# Patient Record
Sex: MALE | Race: ASIAN | Employment: FULL TIME | URBAN - METROPOLITAN AREA
[De-identification: names, ages, dates, MRNs, and addresses within clinical notes are randomized per-mention and may not be internally consistent; named-entity substitution may affect disease eponyms.]

---

## 2018-07-31 LAB
CHOLEST SERPL-MCNC: 125 MG/DL
HDLC SERPL-MCNC: 34 MG/DL
LDLC SERPL CALC-MCNC: 69 MG/DL
NONHDLC SERPL-MCNC: NORMAL MG/DL
TRIGL SERPL-MCNC: 112 MG/DL

## 2019-04-19 ENCOUNTER — TRANSFERRED RECORDS (OUTPATIENT)
Dept: HEALTH INFORMATION MANAGEMENT | Facility: CLINIC | Age: 39
End: 2019-04-19

## 2019-04-19 ENCOUNTER — MEDICAL CORRESPONDENCE (OUTPATIENT)
Dept: HEALTH INFORMATION MANAGEMENT | Facility: CLINIC | Age: 39
End: 2019-04-19

## 2019-04-20 LAB
ANION GAP SERPL CALCULATED.3IONS-SCNC: ABNORMAL MMOL/L
BUN SERPL-MCNC: 11 MG/DL
CALCIUM SERPL-MCNC: 10 MG/DL
CHLORIDE SERPLBLD-SCNC: 102 MMOL/L
CO2 SERPL-SCNC: 25 MMOL/L
CREAT SERPL-MCNC: 0.95 MG/DL
GFR SERPL CREATININE-BSD FRML MDRD: 100 ML/MIN/1.73M2
GLUCOSE SERPL-MCNC: 121 MG/DL (ref 65–99)
POTASSIUM SERPL-SCNC: 4.5 MMOL/L
SODIUM SERPL-SCNC: 142 MMOL/L
TSH SERPL-ACNC: 1.61 MCU/ML

## 2019-04-22 ENCOUNTER — HOSPITAL ENCOUNTER (OUTPATIENT)
Dept: CARDIOLOGY | Facility: CLINIC | Age: 39
Discharge: HOME | End: 2019-04-22
Attending: FAMILY MEDICINE | Admitting: FAMILY MEDICINE
Payer: COMMERCIAL

## 2019-04-22 ENCOUNTER — DOCUMENTATION ONLY (OUTPATIENT)
Dept: CARDIOLOGY | Facility: CLINIC | Age: 39
End: 2019-04-22

## 2019-04-22 DIAGNOSIS — Z82.49 FAMILY HISTORY OF MITRAL VALVE PROLAPSE: ICD-10-CM

## 2019-04-22 DIAGNOSIS — R00.2 PALPITATIONS: ICD-10-CM

## 2019-04-22 PROCEDURE — 93306 TTE W/DOPPLER COMPLETE: CPT

## 2019-04-22 PROCEDURE — 93306 TTE W/DOPPLER COMPLETE: CPT | Mod: 26 | Performed by: INTERNAL MEDICINE

## 2019-04-22 NOTE — PROGRESS NOTES
Records received from patient's PCP including OV note and labs. Labs entered into snapshot, copy sent to scan.

## 2019-05-02 ENCOUNTER — OFFICE VISIT (OUTPATIENT)
Dept: CARDIOLOGY | Facility: CLINIC | Age: 39
End: 2019-05-02
Payer: COMMERCIAL

## 2019-05-02 VITALS
BODY MASS INDEX: 30.53 KG/M2 | SYSTOLIC BLOOD PRESSURE: 153 MMHG | HEART RATE: 80 BPM | HEIGHT: 66 IN | WEIGHT: 190 LBS | DIASTOLIC BLOOD PRESSURE: 98 MMHG

## 2019-05-02 DIAGNOSIS — R00.2 PALPITATIONS: ICD-10-CM

## 2019-05-02 DIAGNOSIS — R03.0 ELEVATED BLOOD PRESSURE READING WITHOUT DIAGNOSIS OF HYPERTENSION: Primary | ICD-10-CM

## 2019-05-02 PROCEDURE — 99204 OFFICE O/P NEW MOD 45 MIN: CPT | Performed by: INTERNAL MEDICINE

## 2019-05-02 ASSESSMENT — MIFFLIN-ST. JEOR: SCORE: 1714.58

## 2019-05-02 NOTE — LETTER
5/2/2019    Herman Mclaughlin MD  [facility] [address]    [facility] [address]    RE: August Egan       Dear Colleague,    I had the pleasure of seeing August Egan in the [facility].    HPI and Plan:   See dictation    Orders Placed This Encounter   Procedures     24 Hour Blood Pressure Monitor - Adult       Orders Placed This Encounter   Medications     UNABLE TO FIND     Sig: daily Vit C complex (super vit c)       There are no discontinued medications.      Encounter Diagnoses   Name Primary?     Elevated blood pressure reading without diagnosis of hypertension Yes     Palpitations        CURRENT MEDICATIONS:  Current Outpatient Medications   Medication Sig Dispense Refill     UNABLE TO FIND daily Vit C complex (super vit c)         ALLERGIES   No Known Allergies    PAST MEDICAL HISTORY:  Past Medical History:   Diagnosis Date     Mitral valve prolapse        PAST SURGICAL HISTORY:  History reviewed. No pertinent surgical history.    FAMILY HISTORY:  Family History   Family history unknown: Yes       SOCIAL HISTORY:  Social History     Socioeconomic History     Marital status:      Spouse name: None     Number of children: None     Years of education: None     Highest education level: None   Occupational History     None   Social Needs     Financial resource strain: None     Food insecurity:     Worry: None     Inability: None     Transportation needs:     Medical: None     Non-medical: None   Tobacco Use     Smoking status: Never Smoker     Smokeless tobacco: Never Used   Substance and Sexual Activity     Alcohol use: Yes     Comment: occassional     Drug use: No     Sexual activity: Yes     Partners: Female   Lifestyle     Physical activity:     Days per week: None     Minutes per session: None     Stress: None   Relationships     Social connections:     Talks on phone: None     Gets together: None     Attends [employer]  "service: None     Active member of club or organization: None     Attends meetings of clubs or organizations: None     Relationship status: None     Intimate partner violence:     Fear of current or ex partner: None     Emotionally abused: None     Physically abused: None     Forced sexual activity: None   Other Topics Concern     Parent/sibling w/ CABG, MI or angioplasty before 65F 55M? Yes   Social History Narrative     None       Review of Systems:  Skin:  Negative       Eyes:  Positive for glasses    ENT:  Negative      Respiratory:  Negative       Cardiovascular:  Negative      Gastroenterology: Negative      Genitourinary:  not assessed      Musculoskeletal:  Negative      Neurologic:  Negative      Psychiatric:  Negative      Heme/Lymph/Imm:  Negative      Endocrine:  Negative        Physical Exam:  Vitals: BP (!) 153/98   Pulse 80   Ht 1.676 m (5' 6\")   Wt 86.2 kg (190 lb)   BMI 30.67 kg/m       Constitutional:  cooperative;in no acute distress        Skin:  warm and dry to the touch          Head:  normocephalic, no masses or lesions        Eyes:           Lymph:No Cervical lymphadenopathy present     ENT:           Neck:  carotid pulses are full and equal bilaterally, JVP normal, no carotid bruit        Respiratory:  normal breath sounds, clear to auscultation, normal A-P diameter, normal symmetry, normal respiratory excursion, no use of accessory muscles         Cardiac: regular rhythm;no murmurs, gallops or rubs detected;normal S1 and S2                pulses full and equal, no bruits auscultated                                        GI:  abdomen soft;no masses;non-tender        Extremities and Muscular Skeletal:  no deformities, clubbing, cyanosis, erythema observed              Neurological:  no gross motor deficits        Psych:  Alert and Oriented x 3        CC  Referred Self, MD  No address on file                Thank you for allowing me to participate in the care of your " patient.      Sincerely,     Archie Robledo MD, MD     [facility]    cc:   Referred Self, MD  No address on file

## 2019-05-02 NOTE — PROGRESS NOTES
HPI and Plan:   See dictation    Orders Placed This Encounter   Procedures     24 Hour Blood Pressure Monitor - Adult       Orders Placed This Encounter   Medications     UNABLE TO FIND     Sig: daily Vit C complex (super vit c)       There are no discontinued medications.      Encounter Diagnoses   Name Primary?     Elevated blood pressure reading without diagnosis of hypertension Yes     Palpitations        CURRENT MEDICATIONS:  Current Outpatient Medications   Medication Sig Dispense Refill     UNABLE TO FIND daily Vit C complex (super vit c)         ALLERGIES   No Known Allergies    PAST MEDICAL HISTORY:  Past Medical History:   Diagnosis Date     Mitral valve prolapse        PAST SURGICAL HISTORY:  History reviewed. No pertinent surgical history.    FAMILY HISTORY:  Family History   Family history unknown: Yes       SOCIAL HISTORY:  Social History     Socioeconomic History     Marital status:      Spouse name: None     Number of children: None     Years of education: None     Highest education level: None   Occupational History     None   Social Needs     Financial resource strain: None     Food insecurity:     Worry: None     Inability: None     Transportation needs:     Medical: None     Non-medical: None   Tobacco Use     Smoking status: Never Smoker     Smokeless tobacco: Never Used   Substance and Sexual Activity     Alcohol use: Yes     Comment: occassional     Drug use: No     Sexual activity: Yes     Partners: Female   Lifestyle     Physical activity:     Days per week: None     Minutes per session: None     Stress: None   Relationships     Social connections:     Talks on phone: None     Gets together: None     Attends [employer] service: None     Active member of club or organization: None     Attends meetings of clubs or organizations: None     Relationship status: None     Intimate partner violence:     Fear of current or ex partner: None     Emotionally abused: None     Physically abused:  "None     Forced sexual activity: None   Other Topics Concern     Parent/sibling w/ CABG, MI or angioplasty before 65F 55M? Yes   Social History Narrative     None       Review of Systems:  Skin:  Negative       Eyes:  Positive for glasses    ENT:  Negative      Respiratory:  Negative       Cardiovascular:  Negative      Gastroenterology: Negative      Genitourinary:  not assessed      Musculoskeletal:  Negative      Neurologic:  Negative      Psychiatric:  Negative      Heme/Lymph/Imm:  Negative      Endocrine:  Negative        Physical Exam:  Vitals: BP (!) 153/98   Pulse 80   Ht 1.676 m (5' 6\")   Wt 86.2 kg (190 lb)   BMI 30.67 kg/m      Constitutional:  cooperative;in no acute distress        Skin:  warm and dry to the touch          Head:  normocephalic, no masses or lesions        Eyes:           Lymph:No Cervical lymphadenopathy present     ENT:           Neck:  carotid pulses are full and equal bilaterally, JVP normal, no carotid bruit        Respiratory:  normal breath sounds, clear to auscultation, normal A-P diameter, normal symmetry, normal respiratory excursion, no use of accessory muscles         Cardiac: regular rhythm;no murmurs, gallops or rubs detected;normal S1 and S2                pulses full and equal, no bruits auscultated                                        GI:  abdomen soft;no masses;non-tender        Extremities and Muscular Skeletal:  no deformities, clubbing, cyanosis, erythema observed              Neurological:  no gross motor deficits        Psych:  Alert and Oriented x 3        CC  Referred Self, MD  No address on file              "

## 2019-05-02 NOTE — PROGRESS NOTES
Service Date: 05/02/2019      REASON FOR CONSULTATION:  Palpitations.      REFERRING PHYSICIAN:  Dr. Maurice with [facility].      HISTORY OF PRESENT ILLNESS:  I had the pleasure of seeing August Egan at the [facility] in [place] this morning.  He is a very pleasant 39-year-old gentleman without prior cardiovascular history who was recently evaluated for palpitations.  He reports palpitations on and off over the last 20 years.  While in the [facility], he tells me he had an echo and was told that he had mitral valve prolapse.  He was told that the palpitations were likely related to the mitral valve prolapse.      He had a rather severe episode approximately 2 weeks ago.  This led to a visit to his primary care doctor's office.  An EKG was performed which showed no significant abnormality.  He subsequently underwent transthoracic echocardiogram which showed normal LV function and no evidence of valvular heart disease.  Specifically, there was no evidence of mitral valve prolapse.  He has not had any recurrent symptoms since then.      Additionally, he has been noticing his blood pressure.  He has been noticing elevated blood pressures over the last several months.  His blood pressure mostly has been running in the 150s-160s.  In the office today, his blood pressure is 153/98.  He does have history of what sounds like secondary hypertension in his family.      IMPRESSION, REPORT AND PLAN:   1.  Palpitations, likely benign.   2.  Elevated blood pressure reading without diagnosis of hypertension.      I have reviewed his recent transthoracic echocardiogram as well as the electrocardiogram.  The echo showed no evidence of mitral valve prolapse or significant mitral valve disorder.  ECG shows a normal sinus rhythm and no significant abnormalities.      The patient's blood pressure is again elevated in the office today.  His blood pressure has always been normal.  However, more  recently his blood pressure has been elevated.  Given his age, this warrants further investigation.  We will place him on a 24-hour ambulatory blood pressure monitoring.  If indeed this confirms that he has hypertension, then I would recommend a workup for secondary hypertension including renal artery ultrasound as well as endocrine workup.  If we are unable to find any evidence of secondary hypertension, then he will probably need to be treated for essential hypertension with antihypertensive medications.      It was a pleasure seeing Mr. Ochoa in the clinic this morning.  I appreciate the opportunity to be part of his care.         RADHA THAYER MD             D: 2019   T: 2019   MT: MATILDA      Name:     AYO OCHOA   MRN:      [id number]        Account:      [id number]   :      1980           Service Date: 2019      Document: [id number]

## 2019-05-02 NOTE — LETTER
5/2/2019      Herman Mclaughlin MD  [facility] [address]    [facility] [address]      RE: August Vázquezasco       Dear Colleague,    I had the pleasure of seeing August Egan in the [facility].    Service Date: 05/02/2019      REASON FOR CONSULTATION:  Palpitations.      REFERRING PHYSICIAN:  Dr. Maurice with [facility].      HISTORY OF PRESENT ILLNESS:  I had the pleasure of seeing August Egan at the [facility] in [place] this morning.  He is a very pleasant 39-year-old gentleman without prior cardiovascular history who was recently evaluated for palpitations.  He reports palpitations on and off over the last 20 years.  While in the [facility], he tells me he had an echo and was told that he had mitral valve prolapse.  He was told that the palpitations were likely related to the mitral valve prolapse.      He had a rather severe episode approximately 2 weeks ago.  This led to a visit to his primary care doctor's office.  An EKG was performed which showed no significant abnormality.  He subsequently underwent transthoracic echocardiogram which showed normal LV function and no evidence of valvular heart disease.  Specifically, there was no evidence of mitral valve prolapse.  He has not had any recurrent symptoms since then.      Additionally, he has been noticing his blood pressure.  He has been noticing elevated blood pressures over the last several months.  His blood pressure mostly has been running in the 150s-160s.  In the office today, his blood pressure is 153/98.  He does have history of what sounds like secondary hypertension in his family.      IMPRESSION, REPORT AND PLAN:   1.  Palpitations, likely benign.   2.  Elevated blood pressure reading without diagnosis of hypertension.      I have reviewed his recent transthoracic echocardiogram as well as the electrocardiogram.  The echo showed no  evidence of mitral valve prolapse or significant mitral valve disorder.  ECG shows a normal sinus rhythm and no significant abnormalities.      The patient's blood pressure is again elevated in the office today.  His blood pressure has always been normal.  However, more recently his blood pressure has been elevated.  Given his age, this warrants further investigation.  We will place him on a 24-hour ambulatory blood pressure monitoring.  If indeed this confirms that he has hypertension, then I would recommend a workup for secondary hypertension including renal artery ultrasound as well as endocrine workup.  If we are unable to find any evidence of secondary hypertension, then he will probably need to be treated for essential hypertension with antihypertensive medications.      It was a pleasure seeing Mr. Ochoa in the clinic this morning.  I appreciate the opportunity to be part of his care.         RADHA THAYER MD             D: 2019   T: 2019   MT: MATILDA      Name:     AYO OCHOA   MRN:      [id number]        Account:      [id number]   :      1980           Service Date: 2019      Document: [id number]         Outpatient Encounter Medications as of 2019   Medication Sig Dispense Refill     UNABLE TO FIND daily Vit C complex (super vit c)       No facility-administered encounter medications on file as of 2019.        Again, thank you for allowing me to participate in the care of your patient.      Sincerely,    Radha Thayer MD, MD     [facility]

## 2019-05-03 ENCOUNTER — HOSPITAL ENCOUNTER (OUTPATIENT)
Dept: CARDIOLOGY | Facility: CLINIC | Age: 39
Discharge: HOME | End: 2019-05-03
Attending: INTERNAL MEDICINE | Admitting: INTERNAL MEDICINE
Payer: COMMERCIAL

## 2019-05-03 DIAGNOSIS — R03.0 ELEVATED BLOOD PRESSURE READING WITHOUT DIAGNOSIS OF HYPERTENSION: ICD-10-CM

## 2019-05-03 PROCEDURE — 93790 AMBL BP MNTR W/SW I&R: CPT | Performed by: INTERNAL MEDICINE

## 2019-05-03 PROCEDURE — 93788 AMBL BP MNTR W/SW A/R: CPT

## 2019-05-13 ENCOUNTER — TELEPHONE (OUTPATIENT)
Dept: CARDIOLOGY | Facility: CLINIC | Age: 39
End: 2019-05-13

## 2019-05-13 NOTE — TELEPHONE ENCOUNTER
Notes recorded by Radha Robledo MD on 5/13/2019 at 8:54 AM CDT  Normal 24 hour BP monitor.    Last OV note 5/2/19 with Dr. Robledo:    The patient's blood pressure is again elevated in the office today.  His blood pressure has always been normal.  However, more recently his blood pressure has been elevated.  Given his age, this warrants further investigation.  We will place him on a 24-hour ambulatory blood pressure monitoring.  If indeed this confirms that he has hypertension, then I would recommend a workup for secondary hypertension including renal artery ultrasound as well as endocrine workup.  If we are unable to find any evidence of secondary hypertension, then he will probably need to be treated for essential hypertension with antihypertensive medications.      It was a pleasure seeing Mr. Egan in the clinic this morning.  I appreciate the opportunity to be part of his care.         RADHA ROBLEDO MD       Will route to Dr. Robledo for review for further plan of care.

## 2019-05-16 NOTE — TELEPHONE ENCOUNTER
Per Dr. Robledo, no antihypertensives needed. Patient can follow up on a PRN basis and work on diet and exercise. Patient updated with results and Dr. Robledo's recommendations via [employer].

## 2020-03-01 ENCOUNTER — HEALTH MAINTENANCE LETTER (OUTPATIENT)
Age: 40
End: 2020-03-01

## 2020-12-13 ENCOUNTER — HEALTH MAINTENANCE LETTER (OUTPATIENT)
Age: 40
End: 2020-12-13

## 2021-04-17 ENCOUNTER — HEALTH MAINTENANCE LETTER (OUTPATIENT)
Age: 41
End: 2021-04-17

## 2021-09-26 ENCOUNTER — HEALTH MAINTENANCE LETTER (OUTPATIENT)
Age: 41
End: 2021-09-26

## 2022-05-08 ENCOUNTER — HEALTH MAINTENANCE LETTER (OUTPATIENT)
Age: 42
End: 2022-05-08

## 2023-01-14 ENCOUNTER — HEALTH MAINTENANCE LETTER (OUTPATIENT)
Age: 43
End: 2023-01-14

## 2023-12-03 ENCOUNTER — HEALTH MAINTENANCE LETTER (OUTPATIENT)
Age: 43
End: 2023-12-03